# Patient Record
Sex: FEMALE | Race: ASIAN | NOT HISPANIC OR LATINO | URBAN - METROPOLITAN AREA
[De-identification: names, ages, dates, MRNs, and addresses within clinical notes are randomized per-mention and may not be internally consistent; named-entity substitution may affect disease eponyms.]

---

## 2019-11-02 ENCOUNTER — EMERGENCY (EMERGENCY)
Facility: HOSPITAL | Age: 34
LOS: 1 days | Discharge: HOME | End: 2019-11-02
Attending: EMERGENCY MEDICINE
Payer: COMMERCIAL

## 2019-11-02 VITALS
SYSTOLIC BLOOD PRESSURE: 121 MMHG | HEIGHT: 61 IN | WEIGHT: 119.93 LBS | OXYGEN SATURATION: 100 % | DIASTOLIC BLOOD PRESSURE: 85 MMHG | HEART RATE: 78 BPM | TEMPERATURE: 98 F | RESPIRATION RATE: 16 BRPM

## 2019-11-02 PROCEDURE — 90471 IMMUNIZATION ADMIN: CPT | Mod: XU

## 2019-11-02 PROCEDURE — 12002 RPR S/N/AX/GEN/TRNK2.6-7.5CM: CPT

## 2019-11-02 PROCEDURE — 90715 TDAP VACCINE 7 YRS/> IM: CPT

## 2019-11-02 PROCEDURE — 99283 EMERGENCY DEPT VISIT LOW MDM: CPT | Mod: 25

## 2019-11-02 PROCEDURE — 99284 EMERGENCY DEPT VISIT MOD MDM: CPT

## 2019-11-02 RX ORDER — TETANUS TOXOID, REDUCED DIPHTHERIA TOXOID AND ACELLULAR PERTUSSIS VACCINE, ADSORBED 5; 2.5; 8; 8; 2.5 [IU]/.5ML; [IU]/.5ML; UG/.5ML; UG/.5ML; UG/.5ML
0.5 SUSPENSION INTRAMUSCULAR ONCE
Refills: 0 | Status: COMPLETED | OUTPATIENT
Start: 2019-11-02 | End: 2019-11-02

## 2019-11-02 RX ADMIN — TETANUS TOXOID, REDUCED DIPHTHERIA TOXOID AND ACELLULAR PERTUSSIS VACCINE, ADSORBED 0.5 MILLILITER(S): 5; 2.5; 8; 8; 2.5 SUSPENSION INTRAMUSCULAR at 21:29

## 2019-11-02 NOTE — ED PROVIDER NOTE - PATIENT PORTAL LINK FT
You can access the FollowMyHealth Patient Portal offered by [facility] by registering at the following website: [email or web address]. By joining [employer]’s FollowMyHealth portal, you will also be able to view your health information using other applications (apps) compatible with our system.

## 2019-11-02 NOTE — ED PROVIDER NOTE - OBJECTIVE STATEMENT
34 year-old female, no significant PMHx, presents with cc laceration to R leg today. Glass bottle fell and a big piece broke off and cut her to lower part of the R leg. Bleeding controlled. Patient c/o localized moderate aching pain. Denies any numbness, tingling, focal weakness or any other complaints or injuries. Last tetanus immunization unknown.

## 2019-11-02 NOTE — ED ADULT TRIAGE NOTE - CHIEF COMPLAINT QUOTE
biba per wheelchair with c/o lacerated wound to back of rt. ankle from a broken wine bottle, no active bleeding

## 2019-11-02 NOTE — ED PROCEDURE NOTE - SUBCUTANEOUS SUTURE
Nursing Note by Matilda Jett RN, BSN at 18 06:42 PM     Author:  Matilda Jett RN, BSN Service:  (none) Author Type:  Registered Nurse     Filed:  18 06:42 PM Encounter Date:  2018 Status:  Signed     :  Matilda Jett RN, BSN (Registered Nurse)            Richard Wolfe brought to exam room.  and name verified.    Chief Complaint     Patient presents with     â¢ Doc Abigail, lanced 2018; now swollen again today and some pain     [place] [facility] was offered treatment for pain control:[ZM1.1T] No.   Provider present for evaluation first.[ZM1.1M]   [facility] with [facility] was on [facility] [address] [facility] with [facility] was on 2018 at  4:55 PM in [address] [facility]  Match done based on reference date of today 18    [address]    Electronically Signed by:    Matidla Jett RN, BSN , 2018[ZM1.1T]             Revision History        User Key Date/Time User Provider Type Action    > ZM1.1 18 06:42 PM Matilda Jett RN, BSN Registered Nurse Sign    M - Manual, T - Template
interrupted

## 2019-11-02 NOTE — ED PROVIDER NOTE - PROGRESS NOTE DETAILS
Lac repaired. Will dc with suture removal in 10 days. Pt is well appearing walking with steady gait, stable for discharge and follow up without fail with medical doctor. I had a detailed discussion with the patient and/or guardian regarding the historical points, exam findings, and any diagnostic results supporting the discharge diagnosis. Pt educated on care and need for follow up. Strict return instructions and red flag signs and symptoms discussed with patient. Questions answered. Pt shows understanding of discharge information and agrees to follow.

## 2019-11-02 NOTE — ED PROVIDER NOTE - ATTENDING CONTRIBUTION TO CARE
seen with acp  c/o 2-4 cm v shaped laceration to right leg secondary to glass injury  wound explored no foreign body noted  laceration will be closed   Agree with acps assessment hx and physical

## 2019-11-02 NOTE — ED PROVIDER NOTE - PHYSICAL EXAMINATION
Lower posterior part of the R leg with V shape laceration ( 6 cm perimeter), different depth of the wound. No sensory deficits distal to injury. DP/PT pulses intact 2+.

## 2019-11-14 ENCOUNTER — EMERGENCY (EMERGENCY)
Facility: HOSPITAL | Age: 34
LOS: 1 days | Discharge: HOME | End: 2019-11-14
Attending: EMERGENCY MEDICINE
Payer: COMMERCIAL

## 2019-11-14 VITALS
TEMPERATURE: 98 F | HEIGHT: 61 IN | WEIGHT: 119.93 LBS | OXYGEN SATURATION: 99 % | HEART RATE: 98 BPM | DIASTOLIC BLOOD PRESSURE: 71 MMHG | RESPIRATION RATE: 18 BRPM | SYSTOLIC BLOOD PRESSURE: 101 MMHG

## 2019-11-14 PROBLEM — Z78.9 OTHER SPECIFIED HEALTH STATUS: Chronic | Status: ACTIVE | Noted: 2019-11-02

## 2019-11-14 PROCEDURE — G0463: CPT

## 2019-11-14 NOTE — ED ADULT NURSE NOTE - CHPI ED NUR SYMPTOMS NEG
no chills/no drainage/no purulent drainage/no redness/no pain/no bleeding at site/no rectal pain/no bleeding/no fever/no inflammation

## 2019-11-14 NOTE — ED PROVIDER NOTE - OBJECTIVE STATEMENT
33 y/o F patient with no significant PMHx and no significant PSHx presents to the ED for a suture removal. Patient has x10 stitches to the right lower leg after a wine bottle dropped on the leg about x10 days ago. Patient's tetanus updated at that time. Patient denies any complications since. NKDA.

## 2019-11-14 NOTE — ED ADULT NURSE NOTE - TEMPLATE
Shave Biopsy Wound Care    Your doctor has performed a shave biopsy today.  A band aid and vaseline ointment has been placed over the site.  This should remain in place for 24 hours.  It is recommended that you keep the area dry for the first 24 hours.  After 24 hours, you may remove the band aid and wash the area with warm soap and water and apply Vaseline jelly.  Many patients prefer to use Neosporin or Bacitracin ointment.  This is acceptable; however, know that you can develop an allergy to this medication even if you have used it safely for years.  It is important to keep the area moist.  Letting it dry out and get air slows healing time, and will worsen the scar.  Band aid is optional after first 24 hours.      If you notice increasing redness, tenderness, pain, or yellow drainage at the biopsy site, please notify your doctor.  These are signs of an infection.    If your biopsy site is bleeding, apply firm pressure for 15 minutes straight.  Repeat for another 15 minutes, if it is still bleeding.   If the surgical site continues to bleed, then please contact your doctor.       [facility]  [facility]  [address]  [place] [address]  Dept: [phone]       CRYOSURGERY      Your doctor has used a method called cryosurgery to treat your skin condition. Cryosurgery refers to the use of very cold substances to treat a variety of skin conditions such as warts, pre-skin cancers, molluscum contagiosum, sun spots, and several benign growths. The substance we use in cryosurgery is liquid nitrogen and is so cold (-195 degrees Celsius) that is burns when administered.     Following treatment in the office, the skin may immediately burn and become red. You may find the area around the lesion is affected as well. It is sometimes necessary to treat not only the lesion, but a small area of the surrounding normal skin to achieve a good response.     A blister, and even a blood filled blister,  may form after treatment.   This is a normal response. If the blister is painful, it is acceptable to sterilize a needle and with rubbing alcohol and gently pop the blister. It is important that you gently wash the area with soap and warm water as the blister fluid may contain wart virus if a wart was treated. Do no remove the roof of the blister.     The area treated can take anywhere from 1-3 weeks to heal. Healing time depends on the kind skin lesion treated, the location, and how aggressively the lesion was treated. It is recommended that the areas treated are covered with Vaseline or bacitracin ointment and a band-aid. If a band-aid is not practical, just ointment applied several times per day will do. Keeping these areas moist will speed the healing time.    Treatment with liquid nitrogen can leave a scar. In dark skin, it may be a light or dark scar, in light skin it may be a white or pink scar. These will generally fade with time, but may never go away completely.     If you have any concerns after your treatment, please feel free to call the office.         [facility]  [facility]  [address] [place] [address]  Dept: [phone]     Wound Check/Suture Removal

## 2019-11-14 NOTE — ED PROVIDER NOTE - PATIENT PORTAL LINK FT
You can access the FollowMyHealth Patient Portal offered by [facility] by registering at the following website: [email or web address]. By joining [employer]’s FollowMyHealth portal, you will also be able to view your health information using other applications (apps) compatible with our system.

## 2019-11-19 DIAGNOSIS — Z48.02 ENCOUNTER FOR REMOVAL OF SUTURES: ICD-10-CM

## 2022-08-16 NOTE — ED ADULT TRIAGE NOTE - NSWEIGHTCALCTOOLDRUG_GEN_A_CORE
used Isotretinoin Counseling: Patient should get monthly blood tests, not donate blood, not drive at night if vision affected, not share medication, and not undergo elective surgery for 6 months after tx completed. Side effects reviewed, pt to contact office should one occur.

## 2022-08-22 NOTE — ED ADULT NURSE NOTE - NSFALLRSKINDICATORS_ED_ALL_ED
Protocol For Photochemotherapy: Tar And Broad Band Uvb (Goeckerman Treatment): The patient received Photochemotherapy: Tar and Broad Band UVB (Goeckerman treatment). no

## 2022-09-02 NOTE — ED PROVIDER NOTE - DISPOSITION TYPE
Called pt and c/o left lower abd pain since last night, unable to sleep. Describes PS 6/10, feels like squeezing left side, feels like tug/pull, cramps every few hour. Denies ROM, contx and vaginal bleeding. Advised to take tylenol/warm pack, will discuss with provider on-call  Pt currently at work, [employer] ED in [employer],  Informed will speak with RN first than call back. Pt agrees. Paged  [address]. Called pt, back and took tylenol without relief, therefore sent to closes ED. Pt agrees. From: Kris Gomez  To: Hira Kendrick MD  Sent: 9/2/2022 11:25 AM CDT  Subject: Pain in [address] Dr. Vince Irizarry! Sorry to bother, but I have been having some pretty consistent pain in the lower left side of my abdomen since last night around 9pm. At first I thought I was pressure from gas, or needing to use the bathroom. However, it has not gone away and I am starting to get a little worried. I am extremely uncomfortable. Is this a normal thing in pregnancy?      Thank you for your help!    -Elizabeth Mooney
On-call provider, LAZARO, notified.
DISCHARGE

## 2023-04-25 NOTE — ED PROCEDURE NOTE - NS ED ATTENDING STATEMENT MOD
I have personally performed a face to face diagnostic evaluation on this patient. I have reviewed the ACP note and agree with the history, exam and plan of care, except as noted. (4) walks frequently

## 2025-02-14 NOTE — ED ADULT NURSE NOTE - PAIN: PRESENCE, MLM
"Daily progress note    Primary care physician  Dr. Melton    Subjective  Awake and alert and feeling better than yesterday with no new complaints.  Patient wants to stay on Eliquis if possible but she was changed to Coumadin few days ago as she cannot afford it.  Patient denies any chest pain shortness of breath palpitation.    History of present illness  67-year-old white female with history of COPD asthma atrial fibrillation hypertension of sleep apnea chronic kidney disease stage IIIb presented to [facility] emergency room with increased shortness of breath and leg swelling.  Patient denies any fever chest pain palpitation abdominal pain nausea vomiting diarrhea.  Patient stated that her diuretics was recently changed by her cardiologist.  Patient workup in ER revealed acute kidney injury and also have elevated troponin admitted for management.     REVIEW OF SYSTEMS  All systems reviewed and negative except for those discussed in HPI.      PHYSICAL EXAM  Blood pressure 98/63, pulse 82, temperature 97.7 °F (36.5 °C), temperature source Oral, resp. rate 16, height 177.8 cm (70\"), weight 119 kg (262 lb), SpO2 97%, not currently breastfeeding.    General: Awake and alert no acute distress.  HENT: NCAT, PERRL, Nares patent.  Eyes: no scleral icterus.  Neck: trachea midline, no ROM limitations.  CV: regular rhythm, regular rate.  Respiratory: normal effort, decreased breath sound at the bases  Abdomen: soft, nondistended, NTTP, no rebound tenderness, no guarding or rigidity.  Musculoskeletal: no deformity.  Neuro: alert, moves all extremities, follows commands.  Skin: warm, dry.  2+ pitting edema bilateral lower extremities.     LAB RESULTS  Lab Results (last 24 hours)       Procedure Component Value Units Date/Time    Eosinophil Smear - Urine, Urine, Clean Catch [[id number]]  (Normal) Collected: 02/14/25 1148    Specimen: Urine, Clean Catch Updated: 02/14/25 1248     Eosinophil Smear 0 % EOS/100 Cells     " Urea Nitrogen, Urine - Urine, Clean Catch [[id number]] Collected: 02/14/25 1148    Specimen: Urine, Clean Catch Updated: 02/14/25 1232     Urea Nitrogen, Urine 464 mg/dL     Narrative:      Reference intervals for random urine have not been established.  Clinical usage is dependent upon physician's interpretation in combination with other laboratory tests.       Sodium, Urine, Random - Urine, Clean Catch [[id number]] Collected: 02/14/25 1148    Specimen: Urine, Clean Catch Updated: 02/14/25 1232     Sodium, Urine 74 mmol/L     Narrative:      Reference intervals for random urine have not been established.  Clinical usage is dependent upon physician's interpretation in combination with other laboratory tests.       Protein, Urine, Random - Urine, Clean Catch [[id number]] Collected: 02/14/25 1148    Specimen: Urine, Clean Catch Updated: 02/14/25 1232     Total Protein, Urine 5.7 mg/dL     Narrative:      Reference intervals for random urine have not been established.  Clinical usage is dependent upon physician's interpretation in combination with other laboratory tests.       Creatinine Urine Random (kidney function) GFR component - Urine, Clean Catch [[id number]] Collected: 02/14/25 1148    Specimen: Urine, Clean Catch Updated: 02/14/25 1232     Creatinine, Urine 58.3 mg/dL     Narrative:      Reference intervals for random urine have not been established.  Clinical usage is dependent upon physician's interpretation in combination with other laboratory tests.       Chloride, Urine, Random - Urine, Clean Catch [[id number]] Collected: 02/14/25 1148    Specimen: Urine, Clean Catch Updated: 02/14/25 1232     Chloride, Urine 70 mmol/L     Narrative:      Reference intervals for random urine have not been established.  Clinical usage is dependent upon physician's interpretation in combination with other laboratory tests.       Urinalysis, Microscopic Only - Urine, Clean Catch [[id number]]  (Abnormal) Collected: 02/14/25 1148     Specimen: Urine, Clean Catch Updated: 02/14/25 1224     RBC, UA 0-2 /HPF      WBC, UA 6-10 /HPF      Bacteria, UA 4+ /HPF      Squamous Epithelial Cells, UA 0-2 /HPF      Hyaline Casts, UA 0-2 /LPF      Methodology Automated Microscopy    Urinalysis With Microscopic If Indicated (No Culture) - Urine, Clean Catch [[id number]]  (Abnormal) Collected: 02/14/25 1148    Specimen: Urine, Clean Catch Updated: 02/14/25 1222     Color, UA Yellow     Appearance, UA Clear     pH, UA 5.5     Specific Gravity, UA 1.012     Glucose, UA Negative     Ketones, UA Negative     Bilirubin, UA Negative     Blood, UA Negative     Protein, UA Negative     Leuk Esterase, UA Small (1+)     Nitrite, UA Negative     Urobilinogen, UA 0.2 E.U./dL    CK [[id number]]  (Normal) Collected: 02/14/25 0648    Specimen: Blood Updated: 02/14/25 1201     Creatine Kinase 27 U/L     TSH [[id number]]  (Normal) Collected: 02/14/25 0648    Specimen: Blood Updated: 02/14/25 0802     TSH 0.494 uIU/mL     High Sensitivity Troponin T [[id number]]  (Abnormal) Collected: 02/14/25 0648    Specimen: Blood Updated: 02/14/25 0802     HS Troponin T 42 ng/L     Narrative:      High Sensitive Troponin T Reference Range:  <14.0 ng/L- Negative Female for AMI  <22.0 ng/L- Negative Male for AMI  >=14 - Abnormal Female indicating possible myocardial injury.  >=22 - Abnormal Male indicating possible myocardial injury.   Clinicians would have to utilize clinical acumen, EKG, Troponin, and serial changes to determine if it is an Acute Myocardial Infarction or myocardial injury due to an underlying chronic condition.         Comprehensive Metabolic Panel [[id number]]  (Abnormal) Collected: 02/14/25 0648    Specimen: Blood Updated: 02/14/25 0756     Glucose 96 mg/dL      BUN 67 mg/dL      Creatinine 1.85 mg/dL      Sodium 139 mmol/L      Potassium 3.8 mmol/L      Chloride 101 mmol/L      CO2 25.0 mmol/L      Calcium 8.9 mg/dL      Total Protein 5.4 g/dL      Albumin 2.9 g/dL      ALT  (SGPT) 14 U/L      AST (SGOT) 27 U/L      Alkaline Phosphatase 131 U/L      Total Bilirubin 0.7 mg/dL      Globulin 2.5 gm/dL      A/G Ratio 1.2 g/dL      BUN/Creatinine Ratio 36.2     Anion Gap 13.0 mmol/L      eGFR 29.4 mL/min/1.73     Narrative:      GFR Categories in Chronic Kidney Disease (CKD)      GFR Category          GFR (mL/min/1.73)    Interpretation  G1                     90 or greater         Normal or high (1)  G2                      60-89                Mild decrease (1)  G3a                   45-59                Mild to moderate decrease  G3b                   30-44                Moderate to severe decrease  G4                    15-29                Severe decrease  G5                    14 or less           Kidney failure          (1)In the absence of evidence of kidney disease, neither GFR category G1 or G2 fulfill the criteria for CKD.    eGFR calculation 2021 CKD-EPI creatinine equation, which does not include race as a factor    Lipid Panel [[id number]] Collected: 02/14/25 0648    Specimen: Blood Updated: 02/14/25 0756     Total Cholesterol 92 mg/dL      Triglycerides 70 mg/dL      HDL Cholesterol 57 mg/dL      LDL Cholesterol  20 mg/dL      VLDL Cholesterol 15 mg/dL      LDL/HDL Ratio 0.37    Narrative:      Cholesterol Reference Ranges  (U.S. Department of Health and Human Services ATP III Classifications)    Desirable          <200 mg/dL  Borderline High    200-239 mg/dL  High Risk          >240 mg/dL      Triglyceride Reference Ranges  (U.S. Department of Health and Human Services ATP III Classifications)    Normal           <150 mg/dL  Borderline High  150-199 mg/dL  High             200-499 mg/dL  Very High        >500 mg/dL    HDL Reference Ranges  (U.S. Department of Health and Human Services ATP III Classifications)    Low     <40 mg/dl (major risk factor for CHD)  High    >60 mg/dl ('negative' risk factor for CHD)        LDL Reference Ranges  (U.S. Department of Health and Human  Services ATP III Classifications)    Optimal          <100 mg/dL  Near Optimal     100-129 mg/dL  Borderline High  130-159 mg/dL  High             160-189 mg/dL  Very High        >189 mg/dL    LDL is calculated using the NIH LDL-C calculation.      Hemoglobin A1c [[id number]]  (Normal) Collected: 02/14/25 0647    Specimen: Blood Updated: 02/14/25 0748     Hemoglobin A1C 5.30 %     Narrative:      Hemoglobin A1C Ranges:    Increased Risk for Diabetes  5.7% to 6.4%  Diabetes                     >= 6.5%  Diabetic Goal                < 7.0%    CBC & Differential [[id number]]  (Abnormal) Collected: 02/14/25 0647    Specimen: Blood Updated: 02/14/25 0733    Narrative:      The following orders were created for panel order CBC & Differential.  Procedure                               Abnormality         Status                     ---------                               -----------         ------                     CBC Auto Differential[[id number]]        Abnormal            Final result                 Please view results for these tests on the individual orders.    CBC Auto Differential [[id number]]  (Abnormal) Collected: 02/14/25 0647    Specimen: Blood Updated: 02/14/25 0733     WBC 3.37 10*3/mm3      RBC 3.02 10*6/mm3      Hemoglobin 10.4 g/dL      Hematocrit 31.4 %      .0 fL      MCH 34.4 pg      MCHC 33.1 g/dL      RDW 14.4 %      RDW-SD 54.1 fl      MPV 10.1 fL      Platelets 162 10*3/mm3      Neutrophil % 57.3 %      Lymphocyte % 25.5 %      Monocyte % 15.1 %      Eosinophil % 1.2 %      Basophil % 0.6 %      Immature Grans % 0.3 %      Neutrophils, Absolute 1.93 10*3/mm3      Lymphocytes, Absolute 0.86 10*3/mm3      Monocytes, Absolute 0.51 10*3/mm3      Eosinophils, Absolute 0.04 10*3/mm3      Basophils, Absolute 0.02 10*3/mm3      Immature Grans, Absolute 0.01 10*3/mm3      nRBC 0.0 /100 WBC     BNP [[id number]]  (Abnormal) Collected: 02/13/25 1443    Specimen: Blood Updated: 02/13/25 1844     proBNP 4,034.0  pg/mL     Narrative:      This assay is used as an aid in the diagnosis of individuals suspected of having heart failure. It can be used as an aid in the diagnosis of acute decompensated heart failure (ADHF) in patients presenting with signs and symptoms of ADHF to the emergency department (ED). In addition, NT-proBNP of <300 pg/mL indicates ADHF is not likely.    Age Range Result Interpretation  NT-proBNP Concentration (pg/mL:      <50             Positive            >450                   Gray                 300-450                    Negative             <300    50-75           Positive            >900                  Gray                300-900                  Negative            <300      >75             Positive            >1800                  Gray                300-1800                  Negative            <300    High Sensitivity Troponin T 1Hr [[id number]]  (Abnormal) Collected: 02/13/25 1443    Specimen: Blood Updated: 02/13/25 1521     HS Troponin T 41 ng/L      Troponin T Numeric Delta 3 ng/L      Troponin T % Delta 8    Narrative:      High Sensitive Troponin T Reference Range:  <14.0 ng/L- Negative Female for AMI  <22.0 ng/L- Negative Male for AMI  >=14 - Abnormal Female indicating possible myocardial injury.  >=22 - Abnormal Male indicating possible myocardial injury.   Clinicians would have to utilize clinical acumen, EKG, Troponin, and serial changes to determine if it is an Acute Myocardial Infarction or myocardial injury due to an underlying chronic condition.         Comprehensive Metabolic Panel [[id number]]  (Abnormal) Collected: 02/13/25 1341    Specimen: Blood Updated: 02/13/25 1412     Glucose 94 mg/dL      BUN 69 mg/dL      Creatinine 2.15 mg/dL      Sodium 135 mmol/L      Potassium 4.0 mmol/L      Chloride 100 mmol/L      CO2 22.0 mmol/L      Calcium 9.2 mg/dL      Total Protein 6.0 g/dL      Albumin 3.2 g/dL      ALT (SGPT) 17 U/L      AST (SGOT) 28 U/L      Alkaline Phosphatase 150  U/L      Total Bilirubin 0.8 mg/dL      Globulin 2.8 gm/dL      A/G Ratio 1.1 g/dL      BUN/Creatinine Ratio 32.1     Anion Gap 13.0 mmol/L      eGFR 24.5 mL/min/1.73     Narrative:      GFR Categories in Chronic Kidney Disease (CKD)      GFR Category          GFR (mL/min/1.73)    Interpretation  G1                     90 or greater         Normal or high (1)  G2                      60-89                Mild decrease (1)  G3a                   45-59                Mild to moderate decrease  G3b                   30-44                Moderate to severe decrease  G4                    15-29                Severe decrease  G5                    14 or less           Kidney failure          (1)In the absence of evidence of kidney disease, neither GFR category G1 or G2 fulfill the criteria for CKD.    eGFR calculation 2021 CKD-EPI creatinine equation, which does not include race as a factor    High Sensitivity Troponin T [[id number]]  (Abnormal) Collected: 02/13/25 1341    Specimen: Blood Updated: 02/13/25 1412     HS Troponin T 38 ng/L     Narrative:      High Sensitive Troponin T Reference Range:  <14.0 ng/L- Negative Female for AMI  <22.0 ng/L- Negative Male for AMI  >=14 - Abnormal Female indicating possible myocardial injury.  >=22 - Abnormal Male indicating possible myocardial injury.   Clinicians would have to utilize clinical acumen, EKG, Troponin, and serial changes to determine if it is an Acute Myocardial Infarction or myocardial injury due to an underlying chronic condition.         BNP [[id number]]  (Abnormal) Collected: 02/13/25 1341    Specimen: Blood Updated: 02/13/25 1412     proBNP 4,044.0 pg/mL     Narrative:      This assay is used as an aid in the diagnosis of individuals suspected of having heart failure. It can be used as an aid in the diagnosis of acute decompensated heart failure (ADHF) in patients presenting with signs and symptoms of ADHF to the emergency department (ED). In addition, NT-proBNP  of <300 pg/mL indicates ADHF is not likely.    Age Range Result Interpretation  NT-proBNP Concentration (pg/mL:      <50             Positive            >450                   Gray                 300-450                    Negative             <300    50-75           Positive            >900                  Gray                300-900                  Negative            <300      >75             Positive            >1800                  Gray                300-1800                  Negative            <300    Magnesium [[id number]]  (Normal) Collected: 02/13/25 1341    Specimen: Blood Updated: 02/13/25 1412     Magnesium 1.7 mg/dL     aPTT [[id number]]  (Normal) Collected: 02/13/25 1341    Specimen: Blood Updated: 02/13/25 1404     PTT 29.4 seconds     Protime-INR [[id number]]  (Abnormal) Collected: 02/13/25 1341    Specimen: Blood Updated: 02/13/25 1403     Protime 21.2 Seconds      INR 1.82    CBC & Differential [[id number]]  (Abnormal) Collected: 02/13/25 1341    Specimen: Blood Updated: 02/13/25 1354    Narrative:      The following orders were created for panel order CBC & Differential.  Procedure                               Abnormality         Status                     ---------                               -----------         ------                     CBC Auto Differential[[id number]]        Abnormal            Final result                 Please view results for these tests on the individual orders.    CBC Auto Differential [[id number]]  (Abnormal) Collected: 02/13/25 1341    Specimen: Blood Updated: 02/13/25 1354     WBC 3.73 10*3/mm3      RBC 3.39 10*6/mm3      Hemoglobin 11.4 g/dL      Hematocrit 35.1 %      .5 fL      MCH 33.6 pg      MCHC 32.5 g/dL      RDW 14.1 %      RDW-SD 52.5 fl      MPV 10.0 fL      Platelets 192 10*3/mm3      Neutrophil % 66.8 %      Lymphocyte % 18.2 %      Monocyte % 12.9 %      Eosinophil % 1.1 %      Basophil % 0.5 %      Immature Grans % 0.5 %      Neutrophils,  Absolute 2.49 10*3/mm3      Lymphocytes, Absolute 0.68 10*3/mm3      Monocytes, Absolute 0.48 10*3/mm3      Eosinophils, Absolute 0.04 10*3/mm3      Basophils, Absolute 0.02 10*3/mm3      Immature Grans, Absolute 0.02 10*3/mm3      nRBC 0.0 /100 WBC           Imaging Results (Last 24 Hours)       Procedure Component Value Units Date/Time    XR Chest 1 View [[id number]] Collected: 02/13/25 1406     Updated: 02/13/25 1412    Narrative:      XR CHEST 1 VW-     HISTORY: Female who is 67 years-old, lower extremity edema     TECHNIQUE: Frontal view of the chest     COMPARISON: 6/17/2021     FINDINGS: The heart is enlarged. Pulmonary vasculature is unremarkable.  Minimal likely atelectasis at the right base. No focal pulmonary  consolidation, pleural effusion, or pneumothorax. No acute osseous  process.       Impression:      Minimal likely atelectasis at the right base. Cardiomegaly.  Follow-up as clinical indications persist.     This report was finalized on 2/13/2025 2:09 PM by Dr. Herrera Mistry M.D on Workstation: [email or web address]             Scan on 2/13/2025 1450 by [place], [place]: ECG 12-LEAD         Author: -- Service: -- Author Type: --   Filed: Date of Service: Creation Time:   Status: (Other)   HEART RATE=81  bpm  RR Teyvjtrp=434  ms  MD Interval=  ms  P Horizontal Axis=  deg  P Front Axis=  deg  QRSD Interval=96  ms  QT Cmumzdny=910  ms  JLpQ=955  ms  QRS Axis=27  deg  T Wave Axis=41  deg  - ABNORMAL ECG -  Atrial fibrillation  Ventricular bigeminy  Low voltage, extremity and precordial leads  Abnormal R-wave progression, early transition          Current Facility-Administered Medications:     acetaminophen (TYLENOL) 160 MG/5ML oral solution 650 mg, 650 mg, Oral, Q6H PRN, Nathan Hooper MD    acetaminophen (TYLENOL) tablet 650 mg, 650 mg, Oral, Q4H PRN, Nathan Hooper MD, 650 mg at 02/14/25 0802    albuterol (PROVENTIL) nebulizer solution 0.083% 2.5 mg/3mL, 2.5 mg, Nebulization, Q4H PRN, Nathan Hooper MD     allopurinol (ZYLOPRIM) tablet 100 mg, 100 mg, Oral, Daily, Nathan Hooper MD, 100 mg at 02/14/25 1009    apixaban (ELIQUIS) tablet 2.5 mg, 2.5 mg, Oral, BID, Nathan Hooper MD, 2.5 mg at 02/14/25 1143    arformoterol (BROVANA) nebulizer solution 15 mcg, 15 mcg, Nebulization, BID - RT **AND** budesonide (PULMICORT) nebulizer solution 0.5 mg, 0.5 mg, Nebulization, BID - RT, 0.5 mg at 02/13/25 2036 **AND** revefenacin (YUPELRI) nebulizer solution 175 mcg, 175 mcg, Nebulization, Daily - RT, Nathan Hooper MD, 175 mcg at 02/13/25 2036    aspirin chewable tablet 81 mg, 81 mg, Oral, Daily, Nahtan Hooper MD, 81 mg at 02/14/25 1010    atorvastatin (LIPITOR) tablet 10 mg, 10 mg, Oral, Nightly, Nathan Hooper MD    famotidine (PEPCID) tablet 20 mg, 20 mg, Oral, BID AC, Nathan Hooper MD, 20 mg at 02/14/25 0801    folic acid (FOLVITE) tablet 1 mg, 1 mg, Oral, Daily, Nathan Hooper MD, 1 mg at 02/14/25 1011    furosemide (LASIX) injection 40 mg, 40 mg, Intravenous, Q12H, Nathan Hooper MD, 40 mg at 02/14/25 0801    HYDROcodone-acetaminophen (NORCO) 7.5-325 MG per tablet 1 tablet, 1 tablet, Oral, BID PRN, Nathan Hooper MD, 1 tablet at 02/14/25 1143    levothyroxine (SYNTHROID, LEVOTHROID) tablet 75 mcg, 75 mcg, Oral, Daily, Nathan Hooper MD, 75 mcg at 02/14/25 1010    metoprolol tartrate (LOPRESSOR) tablet 25 mg, 25 mg, Oral, Q12H, Nathan Hooper MD    montelukast (SINGULAIR) tablet 10 mg, 10 mg, Oral, Daily, Nathan Hooper MD, 10 mg at 02/14/25 1011    multivitamin (THERAGRAN) tablet 1 tablet, 1 tablet, Oral, Daily, Nathan Hooper MD, 1 tablet at 02/14/25 1011    polyethylene glycol (MIRALAX) packet 17 g, 17 g, Oral, Daily, Nathan Hooper MD, 17 g at 02/13/25 2128    thiamine (VITAMIN B-1) tablet 200 mg, 200 mg, Oral, Daily, Nathan Hooper MD, 200 mg at 02/14/25 1010     ASSESSMENT  Acute on chronic diastolic congestive heart failure  Acute kidney injury  Elevated troponin  Asymptomatic bacteriuria  Chronic atrial  fibrillation  Hypertension  Hyperlipidemia  Hypothyroidism  Morbidly obese  Asthma/COPD/obstructive sleep apnea  Chronic kidney disease stage IIIb    PLAN  CPM  Continue IV diuresis  Continue Eliquis for now as patient wants to prefer Eliquis on Coumadin  Strict I's and O's and daily weight  Serial cardiac exam and EKG  Check 2D echo  Cardiology consult appreciated  Nephrology to follow patient  Adjust home medications  Stress ulcer DVT prophylaxis  Supportive care  PT OT   Discussed with nursing staff  Follow closely further recommendation current hospital course    LEONOR CROCKETT MD    Copied text in this note has been reviewed and is accurate as of 02/14/25                complains of pain/discomfort